# Patient Record
Sex: MALE | Race: OTHER | NOT HISPANIC OR LATINO | URBAN - METROPOLITAN AREA
[De-identification: names, ages, dates, MRNs, and addresses within clinical notes are randomized per-mention and may not be internally consistent; named-entity substitution may affect disease eponyms.]

---

## 2024-05-20 ENCOUNTER — EMERGENCY (EMERGENCY)
Facility: HOSPITAL | Age: 19
LOS: 1 days | Discharge: HOME | End: 2024-05-20
Admitting: EMERGENCY MEDICINE
Payer: OTHER MISCELLANEOUS

## 2024-05-20 VITALS
SYSTOLIC BLOOD PRESSURE: 159 MMHG | WEIGHT: 259.93 LBS | OXYGEN SATURATION: 98 % | HEART RATE: 77 BPM | DIASTOLIC BLOOD PRESSURE: 102 MMHG | HEIGHT: 71 IN | TEMPERATURE: 98 F | RESPIRATION RATE: 16 BRPM

## 2024-05-20 PROCEDURE — 73130 X-RAY EXAM OF HAND: CPT | Mod: 26,RT

## 2024-05-20 PROCEDURE — 99284 EMERGENCY DEPT VISIT MOD MDM: CPT

## 2024-05-20 RX ORDER — ACETAMINOPHEN 500 MG
650 TABLET ORAL ONCE
Refills: 0 | Status: COMPLETED | OUTPATIENT
Start: 2024-05-20 | End: 2024-05-20

## 2024-05-20 RX ORDER — TETANUS TOXOID, REDUCED DIPHTHERIA TOXOID AND ACELLULAR PERTUSSIS VACCINE, ADSORBED 5; 2.5; 8; 8; 2.5 [IU]/.5ML; [IU]/.5ML; UG/.5ML; UG/.5ML; UG/.5ML
0.5 SUSPENSION INTRAMUSCULAR ONCE
Refills: 0 | Status: COMPLETED | OUTPATIENT
Start: 2024-05-20 | End: 2024-05-20

## 2024-05-20 RX ORDER — CEPHALEXIN 500 MG
500 CAPSULE ORAL ONCE
Refills: 0 | Status: COMPLETED | OUTPATIENT
Start: 2024-05-20 | End: 2024-05-20

## 2024-05-20 RX ORDER — CEPHALEXIN 500 MG
1 CAPSULE ORAL
Qty: 28 | Refills: 0
Start: 2024-05-20 | End: 2024-05-26

## 2024-05-20 RX ADMIN — TETANUS TOXOID, REDUCED DIPHTHERIA TOXOID AND ACELLULAR PERTUSSIS VACCINE, ADSORBED 0.5 MILLILITER(S): 5; 2.5; 8; 8; 2.5 SUSPENSION INTRAMUSCULAR at 19:05

## 2024-05-20 RX ADMIN — Medication 650 MILLIGRAM(S): at 19:06

## 2024-05-20 RX ADMIN — Medication 500 MILLIGRAM(S): at 19:46

## 2024-05-20 NOTE — ED PROVIDER NOTE - OBJECTIVE STATEMENT
18-year-old male with no reported past medical history presents to the ED complaining of laceration to his right middle finger about 30 minutes prior to arrival to the ED.  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  Patient reports that bleeding has not stopped. Denies any numbness or tingling, difficulty with moving his finger, pain in the wrist. Last tetanus unknown. NKDA.

## 2024-05-20 NOTE — ED PROVIDER NOTE - ADDITIONAL NOTES AND INSTRUCTIONS:
Mr. Spencer was seen at [facility] ED on 5/20/24. Please excuse him from work until 5/25/24.

## 2024-05-20 NOTE — ED PROVIDER NOTE - PATIENT PORTAL LINK FT
You can access the FollowMyHealth Patient Portal offered by [facility] by registering at the following website: [email or web address]. By joining [employer]’s FollowMyHealth portal, you will also be able to view your health information using other applications (apps) compatible with our system.

## 2024-05-20 NOTE — ED PROVIDER NOTE - CARE PROVIDER_API CALL
Payal Butler.  Surgery  [address], [address] [place] [address]  Phone: [phone]  Fax: [phone]  Scheduled Appointment: 05/22/2024

## 2024-05-20 NOTE — ED PROVIDER NOTE - NSFOLLOWUPINSTRUCTIONS_ED_ALL_ED_FT
You were seen in the ED for a fingertip avulsion.     Please follow-up with the hand surgeon on Wednesday - call the number below tomorrow to set up an appointment.    Take cephalexin (Keflex) 500mg tablet FOUR times a day (every 6 hours) for the next 7 days. You got your first dose in the ED today.     Keep dressing covered and dry for 24 hours. If dressing becomes soiled or dirty, slowly remove dressing, clean gently with water and soap. Apply bacitracin and cover as shown to you in the ED. Please return immediately to the Emergency Department if you have any worsening or change in your condition or if you have any other problems or concerns at all, including but not limited to any increased pain, redness, streaking (red lines), swelling, fever, chills, persistent bleeding that is not stopping on its own.

## 2024-05-20 NOTE — ED PROVIDER NOTE - CLINICAL SUMMARY MEDICAL DECISION MAKING FREE TEXT BOX
17 y/o M with no pmhx presents to the ED with avulsion injury of 3rd digit of right hand. Patient currently afebrile, hemodynamically stable, spO2 100%. Based on history and physical, differentials include but are not limited to acute fx. XR with no findings of fracture at this time. Spoke with hand, Dr. Luke Machado who recommends bacitracin application, bulky dressing, abx and f/u in office on Wednesday.

## 2024-05-20 NOTE — ED ADULT TRIAGE NOTE - CCCP TRG CHIEF CMPLNT
"Chief Complaint  ADD and Med Refill    Subjective    History of Present Illness      Patient presents to [facility] for   History of Present Illness  Feels that he  is doing better on the increase dose. It does last longer but not the entire day. He stopped the lamictal due to symptoms of depression.        Review of Systems   All other systems reviewed and are negative.      I have reviewed and agree with the HPI and ROS information as above.  Rufina Sherman, APRN     Objective   Vital Signs:   /81   Pulse 85   Temp 98.7 °F (37.1 °C)   Resp 20   Ht 180.3 cm (71\")   Wt 106 kg (234 lb)   SpO2 98%   BMI 32.64 kg/m²            Physical Exam  Constitutional:       Appearance: Normal appearance. He is well-developed. He is obese.   HENT:      Head: Normocephalic and atraumatic.      Right Ear: External ear normal.      Left Ear: External ear normal.      Nose: Nose normal. No nasal tenderness or congestion.      Mouth/Throat:      Lips: Pink. No lesions.      Mouth: Mucous membranes are moist. No oral lesions.      Dentition: Normal dentition.      Pharynx: Oropharynx is clear. No pharyngeal swelling, oropharyngeal exudate or posterior oropharyngeal erythema.   Eyes:      General: Lids are normal. Vision grossly intact. No scleral icterus.        Right eye: No discharge.         Left eye: No discharge.      Extraocular Movements: Extraocular movements intact.      Conjunctiva/sclera: Conjunctivae normal.      Right eye: Right conjunctiva is not injected.      Left eye: Left conjunctiva is not injected.      Pupils: Pupils are equal, round, and reactive to light.   Cardiovascular:      Rate and Rhythm: Normal rate and regular rhythm.      Heart sounds: Normal heart sounds. No murmur heard.     No gallop.   Pulmonary:      Effort: Pulmonary effort is normal.      Breath sounds: Normal breath sounds and air entry. No wheezing, rhonchi or rales.   Musculoskeletal:         General: " No tenderness or deformity. Normal range of motion.      Cervical back: Full passive range of motion without pain, normal range of motion and neck supple.      Right lower leg: No edema.      Left lower leg: No edema.   Skin:     General: Skin is warm and dry.      Coloration: Skin is not jaundiced.      Findings: No rash.   Neurological:      Mental Status: He is alert and oriented to person, place, and time.      Sensory: Sensation is intact.      Motor: Motor function is intact.      Coordination: Coordination is intact.      Gait: Gait is intact.   Psychiatric:         Attention and Perception: Attention normal.         Mood and Affect: Mood and affect normal.         Behavior: Behavior is not hyperactive. Behavior is cooperative.         Thought Content: Thought content normal.         Judgment: Judgment normal.          MARCOS-7: Over the last two weeks, how often have you been bothered by the following problems?  Feeling nervous, anxious or on edge: Several days  Not being able to stop or control worrying: Several days  Worrying too much about different things: Not at all  Trouble Relaxing: Not at all  Being so restless that it is hard to sit still: Not at all  Becoming easily annoyed or irritable: Not at all  Feeling afraid as if something awful might happen: Not at all  MARCOS 7 Total Score: 2  If you checked any problems, how difficult have these problems made it for you to do your work, take care of things at home, or get along with other people: Not difficult at all    PHQ-2 Depression Screening  Little interest or pleasure in doing things? 0-->not at all   Feeling down, depressed, or hopeless? 0-->not at all   PHQ-2 Total Score 0     PHQ-9 Depression Screening  Little interest or pleasure in doing things? 0-->not at all   Feeling down, depressed, or hopeless? 0-->not at all   Trouble falling or staying asleep, or sleeping too much?     Feeling tired or having little energy?     Poor appetite or overeating?      Feeling bad about yourself - or that you are a failure or have let yourself or your family down?     Trouble concentrating on things, such as reading the newspaper or watching television?     Moving or speaking so slowly that other people could have noticed? Or the opposite - being so fidgety or restless that you have been moving around a lot more than usual?     Thoughts that you would be better off dead, or of hurting yourself in some way?     PHQ-9 Total Score 0   If you checked off any problems, how difficult have these problems made it for you to do your work, take care of things at home, or get along with other people?        Result Review  Data Reviewed:                   Assessment and Plan      Diagnoses and all orders for this visit:    1. Attention deficit hyperactivity disorder (ADHD), combined type (Primary)    2. Mixed anxiety and depressive disorder  -     escitalopram (Lexapro) 10 MG tablet; Take 1 tablet by mouth Daily.  Dispense: 30 tablet; Refill: 0    Patient is seen following up for ADHD.  Patient was also recently started on Lamictal.  He states that Lamictal made him feel very down on himself and having harmful thoughts.  He states he stopped the medication and has since then much improved.  He states that he only gets short fuse and racing mind when he is on his stimulant.  He states he is worse when he is focusing on things and gets interrupted.  He states that he does constantly worry about money.  But overall continues to his normal life and this is not affected with anxiety.  I discussed with him that we will trial a different medication that is not a mood stabilizer in order to see if we can help with this.      Plan  1.ADHD stable with Vyvnase 60mg. UDS is wanda mckeon pending  2. Start Lexapro 10mg. Medication education done. S/E discussed.       ADHD Follow up:    PDMP reviewed and pending. ADRS (Adult ADHD Assessment Form) reviewed in detail, scanned in chart, and has been reviewed  at time of appointment.  All questions, including medication and side effects, were discussed in detail at time of patient's visit. Patient will continue same medication which was discussed at today's visit. Patient is to return in 3 month(s).    Last Urine Toxicity  More data may exist         Latest Ref Rng & Units 3/20/2023 4/15/2022   LAST URINE TOXICITY RESULTS   Amphetamine, Urine Qual Negative Positive  Negative    Barbiturates Screen, Urine Negative Negative  Negative    Benzodiazepine Screen, Urine Negative Negative  Negative    Buprenorphine, Screen, Urine Negative Negative  Negative    Cocaine Screen, Urine Negative Negative  Negative    Methadone Screen , Urine Negative Negative  Negative    Methamphetamine, Ur Negative Negative  Negative         Urine Drug Screen Results: UDS RESULTS: Current results appropriate          Follow Up   Return in about 1 month (around 12/20/2023) for Anxiety.  Patient was given instructions and counseling regarding his condition or for health maintenance advice. Please see specific information pulled into the AVS if appropriate.        lacerations

## 2024-05-20 NOTE — ED PROVIDER NOTE - PHYSICAL EXAMINATION
General: Well appearing in no acute distress, alert and cooperative  Cardiac: Regular rate and regular rhythm, no murmurs, peripheral pulses 2+ and symmetric in b/l upper extremities  Resp: Unlabored respiratory effort, lungs CTAB, speaking in full sentences, no wheezes  MSK: Spine midline and non-tender. Full ROM of right hand. RUE neurovascularly intact.   Skin: Warm and dry. +avulsion to tip of 3rd digit on right hand with avulsion of half of nail and distal phalanx, oozing blood.   Neuro: AO x 3, moves all extremities symmetrically, Motor strength 5/5 bilaterally UE and LE, sensation grossly intact

## 2024-05-23 DIAGNOSIS — M79.644 PAIN IN RIGHT FINGER(S): ICD-10-CM
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
## 2024-05-23 DIAGNOSIS — S61.312A LACERATION WITHOUT FOREIGN BODY OF RIGHT MIDDLE FINGER WITH DAMAGE TO NAIL, INITIAL ENCOUNTER: ICD-10-CM
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
## 2024-05-23 DIAGNOSIS — Z23 ENCOUNTER FOR IMMUNIZATION: ICD-10-CM
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
